# Patient Record
Sex: MALE | Race: BLACK OR AFRICAN AMERICAN | URBAN - METROPOLITAN AREA
[De-identification: names, ages, dates, MRNs, and addresses within clinical notes are randomized per-mention and may not be internally consistent; named-entity substitution may affect disease eponyms.]

---

## 2021-12-18 ENCOUNTER — OFFICE VISIT (OUTPATIENT)
Dept: URBAN - METROPOLITAN AREA CLINIC 10 | Facility: CLINIC | Age: 71
End: 2021-12-18
Payer: MEDICARE

## 2021-12-18 DIAGNOSIS — H52.4 PRESBYOPIA: ICD-10-CM

## 2021-12-18 DIAGNOSIS — H40.9 GLAUCOMA: Primary | ICD-10-CM

## 2021-12-18 PROCEDURE — 92134 CPTRZ OPH DX IMG PST SGM RTA: CPT | Performed by: STUDENT IN AN ORGANIZED HEALTH CARE EDUCATION/TRAINING PROGRAM

## 2021-12-18 PROCEDURE — 99204 OFFICE O/P NEW MOD 45 MIN: CPT | Performed by: STUDENT IN AN ORGANIZED HEALTH CARE EDUCATION/TRAINING PROGRAM

## 2021-12-18 PROCEDURE — 92133 CPTRZD OPH DX IMG PST SGM ON: CPT | Performed by: STUDENT IN AN ORGANIZED HEALTH CARE EDUCATION/TRAINING PROGRAM

## 2021-12-18 ASSESSMENT — VISUAL ACUITY
OS: 20/70
OD: 20/30

## 2021-12-18 ASSESSMENT — INTRAOCULAR PRESSURE
OS: 14
OD: 10

## 2021-12-18 NOTE — IMPRESSION/PLAN
Impression: Glaucoma: H40.9. Plan: Patient educated on condition, IOP's 10/14 today Continue Rhopressa QHS OU and Dorzolamide BID OU Shunt's OU Macular hole OS will impact testing RNFL thinning OU Due to expected severity of glaucoma referring to glaucoma for consult

## 2022-02-21 ENCOUNTER — OFFICE VISIT (OUTPATIENT)
Dept: URBAN - METROPOLITAN AREA CLINIC 10 | Facility: CLINIC | Age: 72
End: 2022-02-21
Payer: MEDICARE

## 2022-02-21 DIAGNOSIS — H04.123 DRY EYE SYNDROME OF BILATERAL LACRIMAL GLANDS: ICD-10-CM

## 2022-02-21 DIAGNOSIS — H40.1132 BILATERAL PRIMARY OPEN-ANGLE GLAUCOMA, MODERATE STAGE: Primary | ICD-10-CM

## 2022-02-21 PROCEDURE — 99204 OFFICE O/P NEW MOD 45 MIN: CPT | Performed by: OPHTHALMOLOGY

## 2022-02-21 PROCEDURE — 92020 GONIOSCOPY: CPT | Performed by: OPHTHALMOLOGY

## 2022-02-21 RX ORDER — LATANOPROST 50 UG/ML
0.005 % SOLUTION OPHTHALMIC
Qty: 2.5 | Refills: 3 | Status: ACTIVE
Start: 2022-02-21

## 2022-02-21 RX ORDER — SODIUM CHLORIDE 50 MG/ML
5 % SOLUTION OPHTHALMIC
Qty: 10 | Refills: 4 | Status: ACTIVE
Start: 2022-02-21

## 2022-02-21 NOTE — IMPRESSION/PLAN
Impression: Dry eye syndrome of bilateral lacrimal glands: H04.123. Plan: Dry eyes account for the patient's complaints. There is no evidence of permanent changes to the cornea. Explained condition does not have a cure and will need artificial tears for maintenance. 

Start Kyra 128 TID OD

## 2022-02-21 NOTE — IMPRESSION/PLAN
Impression: Bilateral primary open-angle glaucoma, moderate stage: Y39.9761.
(+)COPD & Asthma  Plan: Pt has Glaucoma    Gonio : SS 2+    Pachs: 534/502 Today's IOP : 10/8        Tmax  :  58/52 Target IOP Low teens Pt denies Fhx of Glaucoma Right  eye is the better seeing eye HVF OD inferior jf field loss encroaching superior. OS Nasal step encroaching temporal  2/21/22 C/D:  0.75x0.75 thin superior temporal /0.45x0.45
OCT: 57/56 12/18/21 Pt denies Sulfa Allergy   // Pt denies Heart dx Plan :
1. Continue Change  Rhopressa to Latanoprost  QHS OU (2/2 Cost) Dorzolamide BID OU. 
(CAN ADD BRIMONIDINE IN THE FUTURE AS NEEDED) Discussed details about Glaucoma and that without proper control of pressures irreversible blindness can occur. Patient understands risks. Emphasize compliance with drop and without compliance vision loss progression can occur. 2.  IOP and condition appear stable today. Changes being made to current regimen 2/2 Cost. Recommend monitoring condition at this time. 3. RTC 6/8 weeks IOP with Dr. Chelsea Gtz

## 2022-04-25 ENCOUNTER — OFFICE VISIT (OUTPATIENT)
Dept: URBAN - METROPOLITAN AREA CLINIC 10 | Facility: CLINIC | Age: 72
End: 2022-04-25
Payer: MEDICARE

## 2022-04-25 DIAGNOSIS — H40.1132 BILATERAL PRIMARY OPEN-ANGLE GLAUCOMA, MODERATE STAGE: Primary | ICD-10-CM

## 2022-04-25 DIAGNOSIS — H04.123 DRY EYE SYNDROME OF BILATERAL LACRIMAL GLANDS: ICD-10-CM

## 2022-04-25 PROCEDURE — 99214 OFFICE O/P EST MOD 30 MIN: CPT | Performed by: STUDENT IN AN ORGANIZED HEALTH CARE EDUCATION/TRAINING PROGRAM

## 2022-04-25 RX ORDER — BRIMONIDINE TARTRATE 2 MG/ML
0.2 % SOLUTION/ DROPS OPHTHALMIC
Qty: 20 | Refills: 5 | Status: ACTIVE
Start: 2022-04-25

## 2022-04-25 RX ORDER — DORZOLAMIDE HCL 20 MG/ML
2 % SOLUTION/ DROPS OPHTHALMIC
Qty: 20 | Refills: 3 | Status: ACTIVE
Start: 2022-04-25

## 2022-04-25 ASSESSMENT — INTRAOCULAR PRESSURE
OS: 16
OD: 14

## 2022-04-25 NOTE — IMPRESSION/PLAN
Impression: Bilateral primary open-angle glaucoma, moderate stage: Q03.1937.
(+)COPD & Asthma ***patient was not using drops correctly, reset for patient, RTC 6 weeks for IOP  Plan: Pt has Glaucoma    Gonio : SS 2+    Pachs: 534/502 Today's IOP : 18/16 TODAY        Tmax  :  58/52 Target IOP Low teens Pt denies Fhx of Glaucoma Right  eye is the better seeing eye HVF OD inferior jf field loss encroaching superior. OS Nasal step encroaching temporal  2/21/22 C/D:  0.75x0.75 thin superior temporal /0.45x0.45
OCT: 57/56 12/18/21 Pt denies Sulfa Allergy   // Pt denies Heart dx Plan :
1. Continue Change  Latanoprost  QHS OU, Dorzolamide BID OU, Brimonidine TID OU Discussed details about Glaucoma and that without proper control of pressures irreversible blindness can occur. Patient understands risks. Emphasize compliance with drop and without compliance vision loss progression can occur. 2.  IOP and condition appear stable today. Changes being made to current regimen 2/2 Cost. Recommend monitoring condition at this time. 

3. RTC 6/8 weeks IOP with Dr. Rojelio Crandall

## 2022-06-14 ENCOUNTER — OFFICE VISIT (OUTPATIENT)
Dept: URBAN - METROPOLITAN AREA CLINIC 10 | Facility: CLINIC | Age: 72
End: 2022-06-14
Payer: MEDICARE

## 2022-06-14 DIAGNOSIS — H40.1132 BILATERAL PRIMARY OPEN-ANGLE GLAUCOMA, MODERATE STAGE: Primary | ICD-10-CM

## 2022-06-14 DIAGNOSIS — H04.123 DRY EYE SYNDROME OF BILATERAL LACRIMAL GLANDS: ICD-10-CM

## 2022-06-14 PROCEDURE — 99214 OFFICE O/P EST MOD 30 MIN: CPT | Performed by: STUDENT IN AN ORGANIZED HEALTH CARE EDUCATION/TRAINING PROGRAM

## 2022-06-14 RX ORDER — LATANOPROST 50 UG/ML
0.005 % SOLUTION OPHTHALMIC
Qty: 2.5 | Refills: 3 | Status: ACTIVE
Start: 2022-06-14

## 2022-06-14 RX ORDER — DORZOLAMIDE HCL 20 MG/ML
2 % SOLUTION/ DROPS OPHTHALMIC
Qty: 20 | Refills: 3 | Status: ACTIVE
Start: 2022-06-14

## 2022-06-14 RX ORDER — BRIMONIDINE TARTRATE 2 MG/ML
0.2 % SOLUTION/ DROPS OPHTHALMIC
Qty: 20 | Refills: 5 | Status: ACTIVE
Start: 2022-06-14

## 2022-06-14 ASSESSMENT — INTRAOCULAR PRESSURE
OD: 14
OS: 14

## 2022-06-14 NOTE — IMPRESSION/PLAN
Impression: Dry eye syndrome of bilateral lacrimal glands: H04.123. Plan: Dry eyes account for the patient's complaints. There is no evidence of permanent changes to the cornea. Explained condition does not have a cure and will need artificial tears for maintenance. 

Continue Kyra 128 TID OD

## 2022-06-14 NOTE — IMPRESSION/PLAN
Impression: Bilateral primary open-angle glaucoma, moderate stage: V87.7708.
(+)COPD & Asthma Target IOP Low teens Pt denies Fhx of Glaucoma Right  eye is the better seeing eye HVF OD inferior jf field loss encroaching superior. OS Nasal step encroaching temporal  2/21/22 C/D:  0.75x0.75 thin superior temporal /0.45x0.45
OCT: 57/56 12/18/21 Plan: Patient educated on condition,

Plan :
1. Continue Latanoprost  QHS OU, Dorzolamide BID OU, Brimonidine TID OU 2.  RTC 4 months 24-2 VF and IOP check

## 2022-10-13 ENCOUNTER — OFFICE VISIT (OUTPATIENT)
Dept: URBAN - METROPOLITAN AREA CLINIC 10 | Facility: CLINIC | Age: 72
End: 2022-10-13
Payer: MEDICARE

## 2022-10-13 DIAGNOSIS — H04.123 DRY EYE SYNDROME OF BILATERAL LACRIMAL GLANDS: ICD-10-CM

## 2022-10-13 DIAGNOSIS — H40.1132 BILATERAL PRIMARY OPEN-ANGLE GLAUCOMA, MODERATE STAGE: Primary | ICD-10-CM

## 2022-10-13 PROCEDURE — 92083 EXTENDED VISUAL FIELD XM: CPT | Performed by: STUDENT IN AN ORGANIZED HEALTH CARE EDUCATION/TRAINING PROGRAM

## 2022-10-13 PROCEDURE — 99214 OFFICE O/P EST MOD 30 MIN: CPT | Performed by: STUDENT IN AN ORGANIZED HEALTH CARE EDUCATION/TRAINING PROGRAM

## 2022-10-13 RX ORDER — LATANOPROST 50 UG/ML
0.005 % SOLUTION OPHTHALMIC
Qty: 5 | Refills: 3 | Status: ACTIVE
Start: 2022-10-13

## 2022-10-13 RX ORDER — DORZOLAMIDE HCL 20 MG/ML
2 % SOLUTION/ DROPS OPHTHALMIC
Qty: 20 | Refills: 3 | Status: ACTIVE
Start: 2022-10-13

## 2022-10-13 RX ORDER — BRIMONIDINE TARTRATE 2 MG/ML
0.2 % SOLUTION/ DROPS OPHTHALMIC
Qty: 20 | Refills: 5 | Status: ACTIVE
Start: 2022-10-13

## 2022-10-13 RX ORDER — LATANOPROST 50 UG/ML
0.005 % SOLUTION OPHTHALMIC
Qty: 2.5 | Refills: 3 | Status: INACTIVE
Start: 2022-10-13 | End: 2022-10-13

## 2022-10-13 ASSESSMENT — INTRAOCULAR PRESSURE
OS: 14
OD: 15

## 2022-10-13 NOTE — IMPRESSION/PLAN
Impression: Bilateral primary open-angle glaucoma, moderate stage: J21.1378.
(+)COPD & Asthma Target IOP Low teens Pt denies Fhx of Glaucoma Right  eye is the better seeing eye HVF OD inferior jf field loss encroaching superior. OS Nasal step encroaching temporal  10/13/22 (stable to last on 2/21/22) C/D:  0.75x0.75 thin superior temporal /0.45x0.45
OCT: 57/56 12/18/21 Plan: Patient educated on condition, IOP's stable
testing today stable Plan :
1. Continue Latanoprost  QHS OU, Dorzolamide BID OU, Brimonidine TID OU 2.  RTC 4 months for CE with [facility]